# Patient Record
Sex: FEMALE | Race: WHITE | NOT HISPANIC OR LATINO | Employment: FULL TIME | URBAN - METROPOLITAN AREA
[De-identification: names, ages, dates, MRNs, and addresses within clinical notes are randomized per-mention and may not be internally consistent; named-entity substitution may affect disease eponyms.]

---

## 2017-01-16 DIAGNOSIS — R10.2 PELVIC AND PERINEAL PAIN: ICD-10-CM

## 2017-01-17 ENCOUNTER — HOSPITAL ENCOUNTER (OUTPATIENT)
Dept: ULTRASOUND IMAGING | Facility: HOSPITAL | Age: 37
Discharge: HOME | End: 2017-01-17
Payer: COMMERCIAL

## 2017-01-17 DIAGNOSIS — Z30.431 IUD CHECK UP: ICD-10-CM

## 2017-01-17 PROCEDURE — 76857 US EXAM PELVIC LIMITED: CPT

## 2017-02-13 ENCOUNTER — ALLSCRIPTS OFFICE VISIT (OUTPATIENT)
Dept: OTHER | Facility: OTHER | Age: 37
End: 2017-02-13

## 2017-06-17 ENCOUNTER — APPOINTMENT (OUTPATIENT)
Dept: LAB | Facility: MEDICAL CENTER | Age: 37
End: 2017-06-17
Payer: COMMERCIAL

## 2017-06-17 ENCOUNTER — TRANSCRIBE ORDERS (OUTPATIENT)
Dept: ADMINISTRATIVE | Facility: HOSPITAL | Age: 37
End: 2017-06-17

## 2017-06-17 DIAGNOSIS — Z00.8 HEALTH EXAMINATION IN POPULATION SURVEY: Primary | ICD-10-CM

## 2017-06-17 DIAGNOSIS — Z00.8 HEALTH EXAMINATION IN POPULATION SURVEY: ICD-10-CM

## 2017-06-17 LAB
EST. AVERAGE GLUCOSE BLD GHB EST-MCNC: 103 MG/DL
HBA1C MFR BLD: 5.2 % (ref 4.2–6.3)

## 2017-06-17 PROCEDURE — 83036 HEMOGLOBIN GLYCOSYLATED A1C: CPT

## 2017-06-17 PROCEDURE — 36415 COLL VENOUS BLD VENIPUNCTURE: CPT

## 2017-06-21 ENCOUNTER — APPOINTMENT (OUTPATIENT)
Dept: LAB | Facility: HOSPITAL | Age: 37
End: 2017-06-21
Payer: COMMERCIAL

## 2017-06-21 ENCOUNTER — TRANSCRIBE ORDERS (OUTPATIENT)
Dept: ADMINISTRATIVE | Facility: HOSPITAL | Age: 37
End: 2017-06-21

## 2017-06-21 DIAGNOSIS — Z00.8 HEALTH EXAMINATION IN POPULATION SURVEY: ICD-10-CM

## 2017-06-21 DIAGNOSIS — Z00.8 HEALTH EXAMINATION IN POPULATION SURVEY: Primary | ICD-10-CM

## 2017-06-21 LAB
CHOLEST SERPL-MCNC: 159 MG/DL (ref 50–200)
HDLC SERPL-MCNC: 52 MG/DL (ref 40–60)
LDLC SERPL CALC-MCNC: 92 MG/DL (ref 0–100)
TRIGL SERPL-MCNC: 75 MG/DL

## 2017-06-21 PROCEDURE — 36415 COLL VENOUS BLD VENIPUNCTURE: CPT

## 2017-06-21 PROCEDURE — 80061 LIPID PANEL: CPT

## 2017-08-30 ENCOUNTER — ALLSCRIPTS OFFICE VISIT (OUTPATIENT)
Dept: OTHER | Facility: OTHER | Age: 37
End: 2017-08-30

## 2017-12-15 ENCOUNTER — ALLSCRIPTS OFFICE VISIT (OUTPATIENT)
Dept: OTHER | Facility: OTHER | Age: 37
End: 2017-12-15

## 2017-12-16 NOTE — PROGRESS NOTES
Assessment  1  Encounter for gynecological examination () (Z01 702)    Discussion/Summary    Patient informed of a normal GYN exam  A pap was not performed due to her previous normal pap with negative HPV in 2016  She is to return to the office in one year or sooner if needed  The patient has the current Goals: To continue to maintain a healthy lifestyle  The patent has the current Barriers: No barriers  Patient is able to Self-Care  Chief Complaint  Annual visit      History of Present Illness  HPI: Patient is a 40year old  with prior 2 vaginal deliveries  Method of contraception is Mirena  She does not get a menses, only occasional spotting  She is happily   No issues with intimacy  Denies /GI complaints  Has some anxiety for which she takes xanax as needed  She loss about 50 lbs and is very happy with the progress  There are no new medical or family illnesses to report  GYN HM, Adult Female [facility]: The patient is being seen for a gynecology evaluation  The last health maintenance visit was 1 year(s) ago  General Health: The patient's health since the last visit is described as good  She has regular dental visits  -- She denies vision problems  -- She denies hearing loss  Lifestyle:  She consumes a diverse and healthy diet  -- She does not have any weight concerns  -- She exercises regularly  -- She does not use tobacco -- She consumes alcohol  She reports occasional alcohol use  She typically drinks wine  -- She denies drug use  Reproductive health:  she uses contraception  For contraception, she has an intrauterine device  -- she is sexually active  -- no menses- has IUD  Screening:      Review of Systems   Constitutional: No fever, no chills, feels well, no tiredness, no recent weight gain or loss  ENT: no ear ache, no loss of hearing, no nosebleeds or nasal discharge, no sore throat or hoarseness    Cardiovascular: no complaints of slow or fast heart rate, no chest pain, no palpitations, no leg claudication or lower extremity edema  Respiratory: no complaints of shortness of breath, no wheezing, no dyspnea on exertion, no orthopnea or PND  Breasts: no complaints of breast pain, breast lump or nipple discharge  Gastrointestinal: no complaints of abdominal pain, no constipation, no nausea or diarrhea, no vomiting, no bloody stools  Genitourinary: no complaints of dysuria, no incontinence, no pelvic pain, no dysmenorrhea, no vaginal discharge or abnormal vaginal bleeding  Musculoskeletal: no complaints of arthralgia, no myalgia, no joint swelling or stiffness, no limb pain or swelling  Integumentary: no complaints of skin rash or lesion, no itching or dry skin, no skin wounds  Neurological: no complaints of headache, no confusion, no numbness or tingling, no dizziness or fainting  Over the past 2 weeks, how often have you been bothered by the following problems? 1 ) Little interest or pleasure in doing things? Not at all   2 ) Feeling down, depressed or hopeless? Not at all   3 ) Trouble falling asleep or sleeping too much? Not at all   4 ) Feeling tired or having little energy? Not at all   5 ) Poor appetite or overeating? Not at all   6 ) Feeling bad about yourself, or that you are a failure, or have let yourself or your family down? Not at all   7 ) Trouble concentrating on things, such as reading a newspaper or watching television? Not at all   8 ) Moving or speaking so slowly that other people could have noticed, or the opposite, moving or speaking faster than usual? Not at all  How difficult have these problems made it for you to do your work, take care of things at home, or get along with people? Not at all  Score       OB History  Pregnancy History (Brief):  Prior pregnancies: : 2  Para: 2 (full-term)-- and-- 2 (living)  Delivery type: 2 vaginal       Active Problems  1  Abdominal pain, LLQ (left lower quadrant) (789 04) (R10 32)   2   Abdominal pain, RLQ (right lower quadrant) (789 03) (R10 31)   3  Abnormal TSH (790 6) (R94 6)   4  Acute frontal sinusitis (461 1) (J01 10)   5  Acute URI (465 9) (J06 9)   6  Anxiety (300 00) (F41 9)   7  Anxiety (300 00) (F41 9)   8  Back pain (724 5) (M54 9)   9  Cervicalgia (723 1) (M54 2)   10  Dehydration, mild (276 51) (E86 0)   11  Encounter for gynecological examination (V72 31) (Z01 419)   12  Encounter for insertion of mirena IUD (V25 11) (Z30 430)   13  Generalized anxiety disorder (300 02) (F41 1)   14  Headache (784 0) (R51)   15  Insomnia (780 52) (G47 00)   16  IUD (intrauterine device) in place (V45 51) (Z97 5)   17  Lumbar paraspinal muscle spasm (724 8) (M62 830)   18  Lump in neck (784 2) (R22 1)   19  Lymphadenopathy (785 6) (R59 1)   20  Non-toxic multinodular goiter (241 1) (E04 2)   21  Panic disorder (300 01) (F41 0)   22  Pelvic pain (R10 2)   23  Ringworm (110 9) (B35 9)   24  Skin rash (782 1) (R21)   25  Weight gain (783 1) (R63 5)    Past Medical History   · History of Acute sinusitis (461 9) (J01 90)   · History of acute sinusitis (V12 69) (Z87 09)   · Lumbar Sprain (847 2)   · History of Right wrist pain (719 43) (M25 531)    Surgical History   · Denied: History Of Prior Surgery    Family History  Mother    · Family history of Osteoporosis (V17 81)  Family History    · Family history of Family Health Status Of Father - Alive   · Family history of Family Health Status Of Mother - Alive    Social History   · Denied: History of Alcohol Use (History)   · Caffeine Use   · IUD (intrauterine device) in place (V45 51) (Z97 5)   · Never A Smoker   · No drug use    Current Meds   1  Zolpidem Tartrate 5 MG Oral Tablet; 1 Every Day At Bedtime, As Needed; Therapy: 86Mym4576 to (Last Rx:13Oct2017)  Requested for: 13Oct2017 Ordered    Allergies  1   No Known Drug Allergies    Vitals   Recorded: 92DKU8272 57:84UQ   Systolic 789   Diastolic 80   Height 5 ft 7 in   Weight 132 lb    BMI Calculated 20 67   BSA Calculated 1 69     Physical Exam   Constitutional  General appearance: No acute distress, well appearing and well nourished  Neck  Neck: Normal, supple, trachea midline, no masses  Thyroid: Normal, no thyromegaly  Pulmonary  Respiratory effort: No increased work of breathing or signs of respiratory distress  Auscultation of lungs: Clear to auscultation  Cardiovascular  Auscultation of heart: Normal rate and rhythm, normal S1 and S2, no murmurs  Peripheral vascular exam: Normal pulses Throughout  Genitourinary  External genitalia: Normal and no lesions appreciated  Vagina: Normal, no lesions or dryness appreciated  Urethra: Normal    Urethral meatus: Normal    Bladder: Normal, soft, non-tender and no prolapse or masses appreciated  Cervix: Normal, no palpable masses  -- IUD string visualized  Uterus: Normal, non-tender, not enlarged, and no palpable masses  Adnexa/parametria: Normal, non-tender and no fullness or masses appreciated  Chest  Breasts: Normal and no dimpling or skin changes noted  Abdomen  Abdomen: Normal, non-tender, and no organomegaly noted  Liver and spleen: No hepatomegaly or splenomegaly  Examination for hernias: No hernias appreciated  Lymphatic  Palpation of lymph nodes in neck, axillae, groin and/or other locations: No lymphadenopathy or masses noted  Skin  Skin and subcutaneous tissue: Normal skin turgor and no rashes     Palpation of skin and subcutaneous tissue: Normal    Psychiatric  Orientation to person, place, and time: Normal    Mood and affect: Normal        Signatures   Electronically signed by : To Holley; Dec 15 2017  1:43PM EST                       (Author)

## 2018-01-10 NOTE — PROGRESS NOTES
History of Present Illness  HPI: Matthew Saravia with a 19 2# weight loss in the past 4 months with a 16# fat mass loss ( 83%)  Food Journaling - some days 800-1000 on non- exercise days and flex 1200 calories  Work days on the way more hungry   Bariatric MNT [facility]:   Weight Assessment: IBW: 132 5#, Goal Weight: 130#  Excess calories come from in between meal snacking, large portions at mealtimes and high calorie high fat food choices  Dietary Recall:   Breakfast: Shake  Snack: bar  Lunch: Salad with lean protein  Snack: yogurt or fruit  Dinner: [employer]  Snack: smaller carb snack         Exercise Frequency:  She does not exercise  Nutrition Prescription: Estimated calories for weight loss: [place] REE:1253 weight loss with exercise:3570-8438 Without exercise: 873-1123 calories, Estimated daily protein needs: 72-90gm ( 1,2 and Estimated daily fluid needs: 70oz ( 35ml/kg IBW)  Nutrition Intervention: Counseling provided with emphasis on meal planning, portion sizes, healthy snack choices, hydration, fiber intake, exercise and food journaling  Education materials provided: New Direction manual    Comprehension: good  Expected Compliance: good  Goals: follow meal plan prescribed, reduce portion sizes at mealtimes, plan meals and snacks daily, Choose lower-calorie, lower-fat meal options at home and when dining out, food journal, do not skip meals or snacks and 800 -1000 calories daily using 2 meal replacements  Monitor/Evaluation: weekly weight, food journal, fluid intake and exercise level  Active Problems    1  Abdominal pain, LLQ (left lower quadrant) (789 04) (R10 32)   2  Abdominal pain, RLQ (right lower quadrant) (789 03) (R10 31)   3  Abnormal TSH (790 6) (R79 89)   4  Acute frontal sinusitis (461 1) (J01 10)   5  Acute URI (465 9) (J06 9)   6  Anxiety (300 00) (F41 9)   7  Anxiety (300 00) (F41 9)   8  Back pain (724 5) (M54 9)   9  Cervicalgia (723 1) (M54 2)   10   Dehydration, mild (276 51) (E86 0)   11  Generalized anxiety disorder (300 02) (F41 1)   12  Headache (784 0) (R51)   13  Insomnia (780 52) (G47 00)   14  Lumbar paraspinal muscle spasm (724 8) (M62 830)   15  Lump in neck (784 2) (R22 1)   16  Lymphadenopathy (785 6) (R59 1)   17  Non-toxic multinodular goiter (241 1) (E04 2)   18  Panic disorder (300 01) (F41 0)   19  Skin rash (782 1) (R21)   20  Weight gain (783 1) (R63 5)    Past Medical History    1  History of Acute sinusitis (461 9) (J01 90)   2  History of acute sinusitis (V12 69) (Z87 09)   3  Lumbar Sprain (847 2)   4  History of Right wrist pain (719 43) (M25 531)    Surgical History    1  Denied: History Of Prior Surgery    Family History  Mother    1  Family history of Osteoporosis (V17 81)  Family History    2  Family history of Family Health Status Of Father - Alive   3  Family history of Family Health Status Of Mother - Alive    Social History    · Denied: History of Alcohol Use (History)   · Caffeine Use   · Never A Smoker   · No drug use    Current Meds   1  ALPRAZolam 0 25 MG Oral Tablet; TAKE 1 TABLET TWICE DAILY AS NEEDED; Therapy: 09IFW9652 to (Evaluate:22Sjd0905); Last Rx:08Uxq2528 Ordered   2  Ambien 10 MG Oral Tablet Recorded   3  Sertraline HCl - 100 MG Oral Tablet; Take 1 tablet daily; Therapy: 61QKG9105 to (Evaluate:02Drz1567)  Requested for: 42Bfo9821; Last   Rx:96Fbt2542 Ordered   4  TraMADol HCl - 50 MG Oral Tablet; Take one tablet every six hours as needed for pain; Therapy: 85PDL0454 to (Last Rx:70Qbn7101) Ordered   5  Tylenol 325 MG Oral Tablet; Therapy: (Recorded:35Ndf7614) to Recorded   6  Zolpidem Tartrate 5 MG Oral Tablet; 1 Every Day At Bedtime, As Needed; Therapy: 09Lbs7162 to (Last Rx:03Wan3357)  Requested for: 18Jwi9609 Ordered    Allergies    1   No Known Drug Allergies    Vitals  Signs [Data Includes: Current Encounter]   Recorded: 23MTJ9454 09:18AM   Height: 5 ft 7 in  Weight: 147 lb 9 6 oz  BMI Calculated: 23 12  BSA Calculated: 1 78    Results/Data  Encounter Results   Tanita Flowsheet 57LPX8764 11:21AM Benedict Valdez     Test Name Result Flag Reference   Height 66 5     Weight 147 6     Body Fat % 33 1     Fat Mass 48 8     FFM ( Fat Free Mass) 98 8     Muscle Mass 93 8     TBW ( Total Body Water) 65 6     TBW % 44 4     Bone Mass 5 0     BMR ( Basal Metabolic Rate) 8627     Metabolic Age 41     Visceral Fat Rating 4     BMI 20 2       Metabolic Analyzer - POC 84UHS9187 11:20AM Benedict Valdez     Test Name Result Flag Reference   Metabolic Analyzer 0430         Future Appointments    Date/Time Provider Specialty Site   07/14/2016 09:30 AM Benedict Valdez  [facility] WEIGHT MANAGEMENT [place]   07/21/2016 09:30 AM GUY Arango   Bariatric Medicine [facility] WEIGHT MANAGEMENT [place]     Signatures   Electronically signed by : Myrna Castle, ; Jul  3 2016 11:18AM EST                       (Author)    Electronically signed by : GUY Baker ; Jul 5 2016  7:48AM EST                       (Co-author)

## 2018-01-10 NOTE — PROGRESS NOTES
History of Present Illness  HPI: Dejan Lowe with 6 3# in 2 months and overall 8 2 #   Fitnesspal 1200 calories daily  Weekends more challenges She continues to interval eat using 2 meal replacements daily with 1 protein bar  Bariatric MNT [facility]:   Weight Assessment: IBW: 132 5#, Goal Weight: 130-140#  Excess calories come from in between meal snacking, large portions at mealtimes and high calorie high fat food choices  Dietary Recall:   Breakfast: Shake  Snack: string cheese and apple  Lunch: 400   salad chicken  Snack: Bar  Dinner: [employer]  Snack: 80 calorie Yogurt   Beverages: water  Estimated fluid intake: >64oz   She dines out 1-2 times per week  Exercise Frequency:  She exercises walk with kids Steps -8,000 steps   Nutrition Prescription: Estimated calories for weight loss: Tanita BMR 1432 x 1 3 -500-750 = 112-1361 calories , Estimated daily protein needs: 72-90 grams protein  and Estimated daily fluid needs: 70oz  Nutrition Intervention: Counseling provided with emphasis on meal planning, portion sizes, healthy snack choices, hydration, fiber intake, protein intake, exercise and food journaling  Education materials provided: New Direction manual    Comprehension: good  Expected Compliance: good  Goals: follow meal plan prescribed, reduce portion sizes at mealtimes, plan meals and snacks daily, Choose lower-calorie, lower-fat meal options at home and when dining out, food journal, do not skip meals or snacks and 1200 calories and can flex to 1300 calories on days of 8,000 steps and 30-40 minute walk  Monitor/Evaluation: weekly weight, food journal, fluid intake and exercise level  Active Problems     1  Acute frontal sinusitis (461 1) (J01 10)   2  Acute URI (465 9) (J06 9)   3  Anxiety (300 00) (F41 9)   4  Cervicalgia (723 1) (M54 2)   5  Generalized anxiety disorder (300 02) (F41 1)   6  Headache (784 0) (R51)   7  Insomnia (780 52) (G47 00)   8   Lump in neck (784  2) (R22 1)   9  Lymphadenopathy (785 6) (R59 1)   10  Panic disorder (300 01) (F41 0)    Non-toxic multinodular goiter (241 1) (E04 2)       Weight gain (783 1) (R63 5)          Past Medical History    1  History of Acute sinusitis (461 9) (J01 90)   2  Lumbar Sprain (847 2)    Surgical History    1  Denied: History Of Prior Surgery    Family History    1  Family history of Osteoporosis (V17 81)    2  Family history of Family Health Status Of Father - Alive   3  Family history of Family Health Status Of Mother - Alive    Social History    · Denied: History of Alcohol Use (History)   · Caffeine Use   · Never A Smoker   · No drug use    Current Meds   1  ALPRAZolam 0 25 MG Oral Tablet; TAKE 1 TABLET TWICE DAILY AS NEEDED; Therapy: 57UKI7300 to (Evaluate:74Cxj4873); Last Rx:90Uxu2115 Ordered   2  Fluticasone Propionate 50 MCG/ACT Nasal Suspension; USE 1 SPRAY IN EACH   NOSTRIL TWICE DAILY; Therapy: 87AFH8343 to (Last Rx:13Jun2015)  Requested for: 13Jun2015 Ordered   3  Sertraline HCl - 100 MG Oral Tablet; Take 1 tablet daily; Therapy: 75DLX9421 to (Evaluate:05Ngl9501)  Requested for: 92Hga2929; Last   Rx:42Fhj7587 Ordered   4  Tylenol 325 MG Oral Tablet; Therapy: (Recorded:32Flp0129) to Recorded   5  Zolpidem Tartrate 5 MG Oral Tablet; 1 Every Day At Bedtime, As Needed; Therapy: 73Bkn2196 to (Last Rx:32Xnm8751)  Requested for: 75Qet5639 Ordered    Allergies    1  No Known Drug Allergies    Future Appointments    Date/Time Provider Specialty Site   04/07/2016 10:30 AM Jeremias Donnelly  [address]   04/21/2016 10:30 AM Jeremias Donnelly  [facility] WEIGHT MANAGEMENT [place]   05/19/2016 10:00 AM GUY Neville   Bariatric Medicine [facility] WEIGHT [facility]     Signatures   Electronically signed by : Charity Hopper, ; Mar 17 2016 11:04AM EST                       (Author)    Electronically signed by : GUY Cantu ; Mar 17 2016  1:16PM EST (Co-author)

## 2018-01-11 NOTE — PROGRESS NOTES
History of Present Illness  HPI: Jenny Bullock with 14 9# loss in the past 3 months  She reported a hIgher calorie intake the past 2 weeks related to increased stress  She is using 2 meal replacements and a bar daily  Bariatric MNT [facility]:   Weight Assessment: IBW: 132 5#, Goal Weight: 130-140#  Excess calories come from in between meal snacking, large portions at mealtimes and high calorie high fat food choices  Dietary Recall:   Breakfast: Shake  Snack: apple  Lunch: 400  Snack: bar  Dinner: [employer]  Snack: yogurt   Beverages: water  Estimated fluid intake: >64oz  Exercise Frequency:  She does not exercise  Nutrition Prescription: Estimated calories for weight loss: Tanita BMR: 1432 x 1 3 -500-750 = 5026-3712 calories Ecal BMR: 1430 Weight loss : 966-1216 calories, Estimated daily protein needs: 72-90gm ( 1 2-1 5 gm/kg IBW) and Estimated daily fluid needs: 70oz fluid (35ml/kg IBW)  Nutrition Intervention: Counseling provided with emphasis on meal planning, portion sizes, healthy snack choices, hydration, fiber intake, protein intake, exercise and food journaling  Education materials provided: New Direction manual    Comprehension: good  Goals: follow meal plan prescribed, reduce portion sizes at mealtimes, plan meals and snacks daily, Choose lower-calorie, lower-fat meal options at home and when dining out, food journal, do not skip meals or snacks and 1200 -1300 calories   Monitor/Evaluation: weekly weight, food journal, fluid intake and exercise level  Active Problems    1  Abdominal pain, LLQ (left lower quadrant) (789 04) (R10 32)   2  Abdominal pain, RLQ (right lower quadrant) (789 03) (R10 31)   3  Abnormal TSH (790 6) (R79 89)   4  Acute frontal sinusitis (461 1) (J01 10)   5  Acute URI (465 9) (J06 9)   6  Anxiety (300 00) (F41 9)   7  Anxiety (300 00) (F41 9)   8  Back pain (724 5) (M54 9)   9  Cervicalgia (723 1) (M54 2)   10   Dehydration, mild (276 51) (E86 0) 11  Generalized anxiety disorder (300 02) (F41 1)   12  Headache (784 0) (R51)   13  Insomnia (780 52) (G47 00)   14  Lumbar paraspinal muscle spasm (724 8) (M62 830)   15  Lump in neck (784 2) (R22 1)   16  Lymphadenopathy (785 6) (R59 1)   17  Non-toxic multinodular goiter (241 1) (E04 2)   18  Panic disorder (300 01) (F41 0)   19  Skin rash (782 1) (R21)   20  Weight gain (783 1) (R63 5)    Past Medical History    1  History of Acute sinusitis (461 9) (J01 90)   2  History of acute sinusitis (V12 69) (Z87 09)   3  Lumbar Sprain (847 2)   4  History of Right wrist pain (719 43) (M25 531)    Surgical History    1  Denied: History Of Prior Surgery    Family History  Mother    1  Family history of Osteoporosis (V17 81)  Family History    2  Family history of Family Health Status Of Father - Alive   3  Family history of Family Health Status Of Mother - Alive    Social History    · Denied: History of Alcohol Use (History)   · Caffeine Use   · Never A Smoker   · No drug use    Current Meds   1  ALPRAZolam 0 25 MG Oral Tablet; TAKE 1 TABLET TWICE DAILY AS NEEDED; Therapy: 89AZQ2213 to (Evaluate:55Fib6971); Last Rx:23Dec2015 Ordered   2  Ambien 10 MG Oral Tablet Recorded   3  Methocarbamol 750 MG Oral Tablet; TAKE 1 TABLET 4 TIMES DAILY AS NEEDED; Therapy: 26IOV5094 to (Evaluate:04Apr2016); Last Rx:27Mar2016 Ordered   4  Ondansetron 4 MG Oral Tablet Dispersible; 1 tab po q 4-6 hrs prn nausea; Therapy: 62YEU0079 to (Last Rx:27Mar2016) Ordered   5  Sertraline HCl - 100 MG Oral Tablet; Take 1 tablet daily; Therapy: 39IRA7225 to (Evaluate:02Mvy8854)  Requested for: 23Dec2015; Last   Rx:23Dec2015 Ordered   6  TraMADol HCl - 50 MG Oral Tablet; Take one tablet every six hours as needed for pain; Therapy: 20RME4238 to (Last Rx:27Mar2016) Ordered   7  Tylenol 325 MG Oral Tablet; Therapy: (Recorded:84Kbb1816) to Recorded   8  Zoloft 100 MG Oral Tablet Recorded   9   Zolpidem Tartrate 5 MG Oral Tablet; 1 Every Day At Bedtime, As Needed; Therapy: 54Cqy6113 to (Last Rx:32Pvk2999)  Requested for: 89Vpa4530 Ordered    Allergies    1  No Known Drug Allergies    Vitals  Signs [Data Includes: Current Encounter]   Recorded: 77EDV5368 11:06AM   Height: 5 ft 7 in  Weight: 153 lb 12 8 oz  BMI Calculated: 24 09  BSA Calculated: 1 81    Future Appointments    Date/Time Provider Specialty Site   06/02/2016 09:00 AM [facility] WEIGHT [facility]   06/30/2016 09:00 AM [facility]   05/19/2016 10:00 AM GUY Jimenez   Bariatric Medicine [facility]     Signatures   Electronically signed by : Aneesh Spangler, ; May  5 2016 12:24PM EST                       (Author)    Electronically signed by : GUY Bhat ; May  6 2016  9:45AM EST                       (Co-author)

## 2018-01-11 NOTE — PROGRESS NOTES
Chief Complaint  Chief Complaint Free Text Note Form: Pt is here for her 2 month MWM f/u  History of Present Illness  HPI: Patient has been doing well on the program  Still unable to incorporate regular physical activity      Past Medical History    1  History of Acute sinusitis (461 9) (J01 90)   2  History of acute sinusitis (V12 69) (Z87 09)   3  Lumbar Sprain (847 2)   4  History of Right wrist pain (719 43) (M25 531)    Assessment    1  Weight gain (783 1) (R63 5)   2  Anxiety (300 00) (F41 9)   3  Abnormal TSH (790 6) (R79 89)    Plan  Lumbar paraspinal muscle spasm    1  Methocarbamol 750 MG Oral Tablet   2  Ondansetron 4 MG Oral Tablet Dispersible  Unlinked    3  Zoloft 100 MG Oral Tablet (Sertraline HCl)    Discussion/Summary  Discussion Summary:   29 yo female w/ HPL, anxiety and excess weight here to pursue medical weight management to improve weight and health  Weight gain:  -enrolled in [facility] program   -initial focus of 5-10% weight loss over 3-6 mos for improved health  -screening labs-completed    HPL:  -lifestyle changes for now    Goiter:  -TSH-mildly elevated, will recheck at next visit    Anxiety:  -Stopped sertraline     RTC in 2 mos  -add 2 days per week of resistance training  -Try to incorporate more cardio       Active Problems    1  Abdominal pain, LLQ (left lower quadrant) (789 04) (R10 32)   2  Abdominal pain, RLQ (right lower quadrant) (789 03) (R10 31)   3  Abnormal TSH (790 6) (R79 89)   4  Acute frontal sinusitis (461 1) (J01 10)   5  Acute URI (465 9) (J06 9)   6  Anxiety (300 00) (F41 9)   7  Anxiety (300 00) (F41 9)   8  Back pain (724 5) (M54 9)   9  Cervicalgia (723 1) (M54 2)   10  Dehydration, mild (276 51) (E86 0)   11  Generalized anxiety disorder (300 02) (F41 1)   12  Headache (784 0) (R51)   13  Insomnia (780 52) (G47 00)   14  Lumbar paraspinal muscle spasm (724 8) (M62 830)   15  Lump in neck (784 2) (R22 1)   16  Lymphadenopathy (785 6) (R59 1)   17   Non-toxic multinodular goiter (241 1) (E04 2)   18  Panic disorder (300 01) (F41 0)   19  Skin rash (782 1) (R21)   20  Weight gain (783 1) (R63 5)    Surgical History    1  Denied: History Of Prior Surgery    Family History  Mother    1  Family history of Osteoporosis (V17 81)  Family History    2  Family history of Family Health Status Of Father - Alive   3  Family history of Family Health Status Of Mother - Alive    Social History    · Denied: History of Alcohol Use (History)   · Caffeine Use   · Never A Smoker   · No drug use    Current Meds   1  ALPRAZolam 0 25 MG Oral Tablet; TAKE 1 TABLET TWICE DAILY AS NEEDED; Therapy: 12NSS8006 to (Evaluate:31Rya6263); Last Rx:14Ljf5587 Ordered   2  Ambien 10 MG Oral Tablet Recorded   3  Methocarbamol 750 MG Oral Tablet; TAKE 1 TABLET 4 TIMES DAILY AS NEEDED; Therapy: 30SHW2027 to (Evaluate:04Apr2016); Last Rx:27Mar2016 Ordered   4  Ondansetron 4 MG Oral Tablet Dispersible; 1 tab po q 4-6 hrs prn nausea; Therapy: 02FDT9087 to (Last Rx:27Mar2016) Ordered   5  Sertraline HCl - 100 MG Oral Tablet; Take 1 tablet daily; Therapy: 15KAH7060 to (Evaluate:40Zvp9729)  Requested for: 38Xgv5636; Last   Rx:87Ury3926 Ordered   6  TraMADol HCl - 50 MG Oral Tablet; Take one tablet every six hours as needed for pain; Therapy: 62DXR7595 to (Last Rx:27Mar2016) Ordered   7  Tylenol 325 MG Oral Tablet; Therapy: (Recorded:90Nhg1758) to Recorded   8  Zoloft 100 MG Oral Tablet Recorded   9  Zolpidem Tartrate 5 MG Oral Tablet; 1 Every Day At Bedtime, As Needed; Therapy: 29Yqa6988 to (Last Rx:84Dnn8432)  Requested for: 94Eoo4532 Ordered    Allergies    1   No Known Drug Allergies    Vitals  Vital Signs [Data Includes: Current Encounter]    Recorded: 26PSK7034 10:13AM   Temperature 97 6 F   Heart Rate 80   Respiration 16   Systolic 058   Diastolic 70   Height 5 ft 7 in   Weight 152 lb 9 6 oz   BMI Calculated 23 9   BSA Calculated 1 8     Physical Exam    Constitutional   General appearance: No acute distress, well appearing and well nourished  well developed and overweight  Eyes no conjunctival pallor  Ears, Nose, Mouth, and Throat moist oral mucosa  Pulmonary   Respiratory effort: No increased work of breathing or signs of respiratory distress  Auscultation of lungs: Clear to auscultation  Cardiovascular   Auscultation of heart: Normal rate and rhythm, normal S1 and S2, without murmurs  Examination of extremities for edema and/or varicosities: Normal     Abdomen   Abdomen: Non-tender, no masses  The abdomen was soft and nontender  Musculoskeletal   Gait and station: Normal     Psychiatric   Orientation to person, place, and time: Normal     Mood and affect: Normal          Future Appointments    Date/Time Provider Specialty Site   06/23/2016 09:30 AM Sinan Jeannie  [place] [address]   06/30/2016 09:00 AM oMre Vargas   07/21/2016 09:30 AM GUY Ramírez   Bariatric Medicine [facility]     Signatures   Electronically signed by : GUY Mccoy ; May 19 2016  4:44PM EST                       (Author)    Electronically signed by : GUY Mccoy ; May 19 2016  4:45PM EST                       (Author)

## 2018-01-12 NOTE — PROGRESS NOTES
Chief Complaint  Chief Complaint Free Text Note Form: Pt is here for her 1 month ND f/u  She denies any concerns  Past Medical History    1  History of Acute sinusitis (461 9) (J01 90)   2  Lumbar Sprain (847 2)    Assessment    1  Weight gain (783 1) (R63 5)   2  Non-toxic multinodular goiter (241 1) (E04 2)   3  Abnormal TSH (790 6) (R79 89)    Discussion/Summary  Discussion Summary:   29 yo female w/ HPL, anxiety and excess weight here to pursue medical weight management to improve weight and health  Weight gain:  -enrolled in [facility] program   -initial focus of 5-10% weight loss over 3-6 mos for improved health  -screening labs-completed    HPL:  -lifestyle changes for now    Goiter:  -TSH-mildly elevated, will recheck at next visit    Anxiety:  -on sertraline and alprazolam    RTC in 2 mos  -add 2 days per week of resistance training       Active Problems    1  Acute frontal sinusitis (461 1) (J01 10)   2  Acute URI (465 9) (J06 9)   3  Anxiety (300 00) (F41 9)   4  Cervicalgia (723 1) (M54 2)   5  Generalized anxiety disorder (300 02) (F41 1)   6  Headache (784 0) (R51)   7  Insomnia (780 52) (G47 00)   8  Lump in neck (784 2) (R22 1)   9  Lymphadenopathy (785 6) (R59 1)   10  Non-toxic multinodular goiter (241 1) (E04 2)   11  Panic disorder (300 01) (F41 0)   12  Weight gain (783 1) (R63 5)    Surgical History    1  Denied: History Of Prior Surgery    Family History    1  Family history of Osteoporosis (V17 81)    2  Family history of Family Health Status Of Father - Alive   3  Family history of Family Health Status Of Mother - Alive    Social History    · Denied: History of Alcohol Use (History)   · Caffeine Use   · Never A Smoker   · No drug use    Current Meds   1  ALPRAZolam 0 25 MG Oral Tablet; TAKE 1 TABLET TWICE DAILY AS NEEDED; Therapy: 21FOI8433 to (Evaluate:80Nvm8789); Last Rx:83Vek7473 Ordered   2   Fluticasone Propionate 50 MCG/ACT Nasal Suspension; USE 1 SPRAY IN EACH   NOSTRIL TWICE DAILY; Therapy: 11EHW3506 to (Last Rx:13Jun2015)  Requested for: 13Jun2015 Ordered   3  Sertraline HCl - 100 MG Oral Tablet; Take 1 tablet daily; Therapy: 75PEJ1677 to (Evaluate:90Imk4220)  Requested for: 65Jxj5857; Last   Rx:10Yvd8487 Ordered   4  Tylenol 325 MG Oral Tablet; Therapy: (Recorded:66Gdy4075) to Recorded   5  Zolpidem Tartrate 5 MG Oral Tablet; 1 Every Day At Bedtime, As Needed; Therapy: 09Czu0480 to (Last Rx:41Hur0744)  Requested for: 59Xbt9233 Ordered    Allergies    1  No Known Drug Allergies    Vitals  Vital Signs [Data Includes: Current Encounter]    Recorded: 21TSL7673 10:31AM   Temperature 98 6 F   Heart Rate 80   Respiration 16   Systolic 584   Diastolic 76   Height 5 ft 7 in   Weight 160 lb 8 0 oz   BMI Calculated 25 14   BSA Calculated 1 84     Physical Exam    Constitutional   General appearance: No acute distress, well appearing and well nourished  well developed and overweight  Eyes no conjunctival pallor  Ears, Nose, Mouth, and Throat moist oral mucosa  Pulmonary   Respiratory effort: No increased work of breathing or signs of respiratory distress  Auscultation of lungs: Clear to auscultation  Cardiovascular   Auscultation of heart: Normal rate and rhythm, normal S1 and S2, without murmurs  Examination of extremities for edema and/or varicosities: Normal     Abdomen   Abdomen: Non-tender, no masses  The abdomen was soft and nontender  Musculoskeletal   Gait and station: Normal     Psychiatric   Orientation to person, place, and time: Normal     Mood and affect: Normal          Future Appointments    Date/Time Provider Specialty Site   04/07/2016 10:30 AM Flor Eliasspan  [address]   04/21/2016 10:30 AM More Vargas    05/19/2016 10:00 AM GUY Jhaveri   Bariatric Medicine [facility]     Signatures   Electronically signed by : GUY Treadwell ; Mar 17 2016 12:30PM EST (Author)

## 2018-01-13 NOTE — PROGRESS NOTES
History of Present Illness  HPI: Wilver Lebron presented for her [employer] visit with the [employer]  She stated that she has experienced a gradual gain  Her food recall reveals that she often goes long periods between meals and weekends vary with calorie level  Bariatric MNT MWM [facility]:   Weight Assessment: IBW: 132 5# ( 60 2kg) WT: 166 8# BMI: 26 5, ABW: 141#, Goal Weight: 130-140#  Excess calories come from in between meal snacking and high calorie high fat food choices  Dietary Recall:   Breakfast: wake 7:00am - green tea   Work 9:00am skip  Carb    Snack: 10:30 yogurt/ banana  Lunch: 1:00pm Bring   Chicken/ vegetable  Soup/ salad from cafe  Snack: chocolate - piece  fruit  Dinner: Home 8:00pm - green tea   Days off - lean protein / veggies/ starch  Snack: wkd snack   Beverages: Diet Green Tea/ water  Estimated fluid intake: >64oz  Food allergies/intolerances: pamper  spice   Cooking:   Shopping: patient  She dines out 1-2 times per week  Exercise Frequency:  She exercises would walk  Nutrition Prescription: Estimated calories for weight loss: Tanita BMR: 1432 x 1  3 - 750-500 = 6098-1364 calories Ecal BMR: 1490 Weight loss range: 788-1049 calories, Estimated daily protein needs: 72-90 gm (1 2-1 5 gm/kg IBW) and Estimated daily fluid needs: 70oz ( 35ml/kg IBW)  Breakfast: Shake  Snack: 150  Lunch: 400-500  Snack: Bar  Dinner: [employer]  Snack: skip   Nutrition Intervention: Counseling provided with emphasis on meal planning, portion sizes, healthy snack choices, hydration, fiber intake, exercise and food journaling  Education materials provided: [employer] manual, calorie controlled menus, low carbohydrate meal planning, high fiber, lean protein food choices and food journal tips  Comprehension: good  Expected Compliance: good     Goals: follow meal plan prescribed, reduce portion sizes at mealtimes, plan meals and snacks daily, Choose lower-calorie, lower-fat meal options at home and when dining out, food journal, do not skip meals or snacks, increase protein intake 90grams daily, increase fluid intake 70oz daily and 1200 calories using 2 meal replacements and 1 protien bar  Monitor/Evaluation: weekly weight, food journal, fluid intake and exercise level  Active Problems    1  Acute frontal sinusitis (461 1) (J01 10)   2  Acute URI (465 9) (J06 9)   3  Anxiety (300 00) (F41 9)   4  Cervicalgia (723 1) (M54 2)   5  Generalized anxiety disorder (300 02) (F41 1)   6  Headache (784 0) (R51)   7  Insomnia (780 52) (G47 00)   8  Lump in neck (784 2) (R22 1)   9  Lymphadenopathy (785 6) (R59 1)   10  Non-toxic multinodular goiter (241 1) (E04 2)   11  Panic disorder (300 01) (F41 0)   12  Weight gain (783 1) (R63 5)    Past Medical History    1  History of Acute sinusitis (461 9) (J01 90)   2  Lumbar Sprain (847 2)    Surgical History    1  Denied: History Of Prior Surgery    Family History    1  Family history of Osteoporosis (V17 81)    2  Family history of Family Health Status Of Father - Alive   3  Family history of Family Health Status Of Mother - Alive    Social History    · Denied: History of Alcohol Use (History)   · Caffeine Use   · Never A Smoker   · No drug use    Current Meds   1  ALPRAZolam 0 25 MG Oral Tablet; TAKE 1 TABLET TWICE DAILY AS NEEDED; Therapy: 93TNE0094 to (Evaluate:14Zbu2493); Last Rx:36Tcd3819 Ordered   2  Fluticasone Propionate 50 MCG/ACT Nasal Suspension; USE 1 SPRAY IN EACH   NOSTRIL TWICE DAILY; Therapy: 24YCT2446 to (Last Rx:13Jun2015)  Requested for: 13Jun2015 Ordered   3  Sertraline HCl - 100 MG Oral Tablet; Take 1 tablet daily; Therapy: 02LVP5874 to (Evaluate:59Dbr2540)  Requested for: 52Ary9596; Last   Rx:48Zel0164 Ordered   4  Tylenol 325 MG Oral Tablet; Therapy: (Recorded:66Ufi8121) to Recorded   5  Zolpidem Tartrate 5 MG Oral Tablet; 1 Every Day At Bedtime, As Needed;    Therapy: 47Xuc4199 to (Last Rx: 48OCR7914)  Requested for: 77Umw0207 Ordered    Allergies    1  No Known Drug Allergies    Vitals  Signs [Data Includes: Current Encounter]   Recorded: 39OAN9049 12:40PM   Height: 5 ft 6 5 in  Weight: 166 lb 12 8 oz  BMI Calculated: 26 52  BSA Calculated: 1 86    Results/Data  Encounter Results   Tanita Flowsheet 60VGK6774 12:17PM Ludmila Medina     Test Name Result Flag Reference   Height 66 5     Weight 166 8     Body Fat % 38 8     Fat Mass 64 8     FFM ( Fat Free Mass) 102     Muscle Mass 96 8     TBW ( Total Body Water) 69     TBW % 41 4     Bone Mass 5 2     BMR ( Basal Metabolic Rate) 8108     Metabolic Age 53     Visceral Fat Rating 6     BMI 26 5         Future Appointments    Date/Time Provider Specialty Site   03/03/2016 10:30 AM Ludmila Medina  [address]   03/03/2016 10:00 AM BROOKLYN Michael, MSW  [facility] [place]   03/17/2016 10:30 AM GUY Guerra   Bariatric Medicine [facility]     Signatures   Electronically signed by : Raymundo Ricketts, ; Feb 19 2016 12:16PM EST                       (Author)    Electronically signed by : GUY Caro ; Feb 19 2016  1:22PM EST                       (Co-author)

## 2018-01-13 NOTE — PROGRESS NOTES
History of Present Illness  HPI: Jordana Gibbons has lost 6 1# last few weeks - had the stomach bug  She has lost overall 14 3# in 2 months  Off schedule but starting back on track  Food Journaling - 1317-6767 calories  Trouble area late afternoon  Bariatric MNT MWM [facility]:   Weight Assessment: IBW: 132 5# ( 60 2kg), Goal Weight: 130-140#  Excess calories come from in between meal snacking, large portions at mealtimes and high calorie high fat food choices  Dietary Recall:   Breakfast: Shake  Snack: string cheese/ apple  Lunch: 400  Snack: bar  Dinner: [employer]  Snack: yogurt   Beverages: water  Estimated fluid intake: 6 cups water   She dines out 1-2 times per week  Exercise Frequency:  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 - Steps work day = 7193-4408 less on home days   Nutrition Prescription: Estimated calories for weight loss: , Estimated daily protein needs: -gm ( 12 -1 5 gm/kg IBW) and Estimated daily fluid needs: oz ( 35ml kg IBW)  Nutrition Intervention: Counseling provided with emphasis on meal planning, portion sizes, healthy snack choices, hydration, fiber intake, protein intake, exercise and food journaling  Education materials provided: [employer] manual    Comprehension: good  Expected Compliance: good  Goals: follow meal plan prescribed, reduce portion sizes at mealtimes, plan meals and snacks daily, Choose lower-calorie, lower-fat meal options at home and when dining out, food journal, do not skip meals or snacks and 1200 caloies flex up to 1300 on exercise days  Monitor/Evaluation: weekly weight, food journal, fluid intake and exercise level  Active Problems    1  Abnormal TSH (790 6) (R79 89)   2  Acute frontal sinusitis (461 1) (J01 10)   3  Acute URI (465 9) (J06 9)   4  Anxiety (300 00) (F41 9)   5  Back pain (724 5) (M54 9)   6  Cervicalgia (723 1) (M54 2)   7  Generalized anxiety disorder (300 02) (F41 1)   8  Headache (784 0) (R51)   9   Insomnia (780 52) (G47 00)   10  Lumbar paraspinal muscle spasm (724 8) (M62 830)   11  Lump in neck (784 2) (R22 1)   12  Lymphadenopathy (785 6) (R59 1)   13  Non-toxic multinodular goiter (241 1) (E04 2)   14  Panic disorder (300 01) (F41 0)   15  Skin rash (782 1) (R21)   16  Weight gain (783 1) (R63 5)    Past Medical History    1  History of Acute sinusitis (461 9) (J01 90)   2  Lumbar Sprain (847 2)    Surgical History    1  Denied: History Of Prior Surgery    Family History    1  Family history of Osteoporosis (V17 81)    2  Family history of Family Health Status Of Father - Alive   3  Family history of Family Health Status Of Mother - Alive    Social History    · Denied: History of Alcohol Use (History)   · Caffeine Use   · Never A Smoker   · No drug use    Current Meds   1  ALPRAZolam 0 25 MG Oral Tablet; TAKE 1 TABLET TWICE DAILY AS NEEDED; Therapy: 65UWP3752 to (Evaluate:72Guq1574); Last Rx:63Aix4872 Ordered   2  Methocarbamol 750 MG Oral Tablet; TAKE 1 TABLET 4 TIMES DAILY AS NEEDED; Therapy: 62MMH5883 to (Evaluate:04Apr2016); Last Rx:27Mar2016 Ordered   3  Ondansetron 4 MG Oral Tablet Dispersible; 1 tab po q 4-6 hrs prn nausea; Therapy: 99PUI5393 to (Last Rx:27Mar2016) Ordered   4  Sertraline HCl - 100 MG Oral Tablet; Take 1 tablet daily; Therapy: 40DLH2989 to (Evaluate:91Ioz1213)  Requested for: 55Xdj0506; Last   Rx:56Csj1416 Ordered   5  TraMADol HCl - 50 MG Oral Tablet; Take one tablet every six hours as needed for pain; Therapy: 64ZNL0963 to (Last Rx:27Mar2016) Ordered   6  Tylenol 325 MG Oral Tablet; Therapy: (Recorded:50Qtj4697) to Recorded   7  Zolpidem Tartrate 5 MG Oral Tablet; 1 Every Day At Bedtime, As Needed; Therapy: 12Fxf9157 to (Last Rx:88Ybx2569)  Requested for: 66Izn4832 Ordered    Allergies    1   No Known Drug Allergies    Future Appointments    Date/Time Provider Specialty Site   04/21/2016 10:30 AM Onofre Dao  [facility]   05/19/2016 10:00 AM Adry Bautista, GUY Adamson   Bariatric Medicine [facility]     Signatures   Electronically signed by : Pat Wang, ; Apr 7 2016 10:22AM EST                       (Author)    Electronically signed by : GUY Gallegos ; Apr 7 2016  1:28PM EST                       (Co-author)

## 2018-01-13 NOTE — MISCELLANEOUS
Provider Comments  Provider Comments:   Dear Violetta Davalos called the office to cancel your appointment for 7/21/16 but did not reschedule for another day  It is very important that you follow up with us so that we can assess your physical and nutritional safety  Please call our office at [phone] to reschedule your appointment       Sincerely,     [facility]        Signatures   Electronically signed by : Jian Mcknight, ; Jul 20 2016  9:01AM EST                       (Author)    Electronically signed by : GUY Crystal ; Jul 20 2016  9:12AM EST                       (Co-author)

## 2018-01-14 VITALS
HEIGHT: 67 IN | WEIGHT: 133.5 LBS | DIASTOLIC BLOOD PRESSURE: 70 MMHG | SYSTOLIC BLOOD PRESSURE: 100 MMHG | BODY MASS INDEX: 20.95 KG/M2

## 2018-01-14 VITALS
SYSTOLIC BLOOD PRESSURE: 100 MMHG | WEIGHT: 128.5 LBS | DIASTOLIC BLOOD PRESSURE: 70 MMHG | BODY MASS INDEX: 20.13 KG/M2 | RESPIRATION RATE: 16 BRPM | TEMPERATURE: 98.6 F | HEART RATE: 72 BPM

## 2018-01-16 NOTE — PROGRESS NOTES
History of Present Illness  HPI: Jennifer An with 3# loss in 2 weeks  Made many dietary changes including interval eating, meal planning, and reducing portion sizes at meal times  Food journaling using My FitnessPal averaging 926 and 1030  Bariatric MNT M[facility]:   Weight Assessment: IBW:     Excess calories come from in between meal snacking, large portions at mealtimes and high calorie high fat food choices  Dietary Recall:   Breakfast: Shake  Snack: 10:30   Carrots and hummas  yogurt  apple string cheese  Lunch: chicken and veggies  Mindful 300-400 calories     Home days - shake for lunch  Snack: Protein  Dinner: [employer]  Home: Dinner with family  Snack: Skip   Beverages: water  Estimated fluid intake: >64oz   She dines out 1-2 times per week  Exercise Frequency:  She does not exercise  Nutrition Prescription: Estimated calories for weight loss: Tanita BMR: 1432 x 1 3- 500-7500 = 9227-7003 calories daily Ecal BMR 1467 Weight loss: 1017, Estimated daily protein needs: 72-90 grams ( 1 2=1 5 gm/kg IBW) and Estimated daily fluid needs: 70 oz ( 35ml/kg IBW)  Nutrition Intervention: Counseling provided with emphasis on meal planning, portion sizes, healthy snack choices, hydration, fiber intake and food journaling  Education materials provided: [employer] manual    Comprehension: good  Expected Compliance: good  Goals: follow meal plan prescribed, reduce portion sizes at mealtimes, plan meals and snacks daily, Choose lower-calorie, lower-fat meal options at home and when dining out, food journal, do not skip meals or snacks and 2959-0724 calories daily using 2 meal replacements and 1 protein bar  Monitor/Evaluation: weekly weight, food journal, fluid intake and exercise level  Active Problems    1  Acute frontal sinusitis (461 1) (J01 10)   2  Acute URI (465 9) (J06 9)   3  Anxiety (300 00) (F41 9)   4  Cervicalgia (723 1) (M54 2)   5   Generalized anxiety disorder (300 02) (F41 1)   6  Headache (784 0) (R51)   7  Insomnia (780 52) (G47 00)   8  Lump in neck (784 2) (R22 1)   9  Lymphadenopathy (785 6) (R59 1)   10  Non-toxic multinodular goiter (241 1) (E04 2)   11  Panic disorder (300 01) (F41 0)   12  Weight gain (783 1) (R63 5)    Past Medical History    1  History of Acute sinusitis (461 9) (J01 90)   2  Lumbar Sprain (847 2)    Surgical History    1  Denied: History Of Prior Surgery    Family History    1  Family history of Osteoporosis (V17 81)    2  Family history of Family Health Status Of Father - Alive   3  Family history of Family Health Status Of Mother - Alive    Social History    · Denied: History of Alcohol Use (History)   · Caffeine Use   · Never A Smoker   · No drug use    Current Meds   1  ALPRAZolam 0 25 MG Oral Tablet; TAKE 1 TABLET TWICE DAILY AS NEEDED; Therapy: 30IYL9884 to (Evaluate:90Uaq2880); Last Rx:29Qdi3425 Ordered   2  Fluticasone Propionate 50 MCG/ACT Nasal Suspension; USE 1 SPRAY IN EACH   NOSTRIL TWICE DAILY; Therapy: 20RAS5285 to (Last Rx:13Jun2015)  Requested for: 13Jun2015 Ordered   3  Sertraline HCl - 100 MG Oral Tablet; Take 1 tablet daily; Therapy: 83KNV0557 to (Evaluate:36Vbd7793)  Requested for: 16Wxr7300; Last   Rx:75Gey8097 Ordered   4  Tylenol 325 MG Oral Tablet; Therapy: (Recorded:17Zqt6860) to Recorded   5  Zolpidem Tartrate 5 MG Oral Tablet; 1 Every Day At Bedtime, As Needed; Therapy: 48Gca7775 to (Last Rx:00Cny7883)  Requested for: 83Szu3317 Ordered    Allergies    1  No Known Drug Allergies    Vitals  Signs [Data Includes: Current Encounter]   Recorded: 97KPD1940 10:59AM   Height: 5 ft 6 5 in  Weight: 163 lb 9 6 oz  BMI Calculated: 26 01  BSA Calculated: 1 85    Future Appointments    Date/Time Provider Specialty Site   03/17/2016 11:00 AM Wilmer Sandhu  [facility]   03/17/2016 10:30 AM GUY Burleson   Bariatric [address] [place] Electronically signed by : Cyndy Hale, ; Mar  4 2016 11:03AM EST                       (Author)    Electronically signed by : GUY Cooper ; Mar  4 2016  1:46PM EST                       (Co-author)

## 2018-01-23 VITALS
WEIGHT: 132 LBS | BODY MASS INDEX: 20.72 KG/M2 | DIASTOLIC BLOOD PRESSURE: 80 MMHG | HEIGHT: 67 IN | SYSTOLIC BLOOD PRESSURE: 110 MMHG

## 2018-08-01 ENCOUNTER — APPOINTMENT (OUTPATIENT)
Dept: LAB | Facility: HOSPITAL | Age: 38
End: 2018-08-01

## 2018-08-01 ENCOUNTER — TRANSCRIBE ORDERS (OUTPATIENT)
Dept: ADMINISTRATIVE | Facility: HOSPITAL | Age: 38
End: 2018-08-01

## 2018-08-01 DIAGNOSIS — Z00.8 HEALTH EXAMINATION IN POPULATION SURVEY: ICD-10-CM

## 2018-08-01 DIAGNOSIS — Z00.8 HEALTH EXAMINATION IN POPULATION SURVEY: Primary | ICD-10-CM

## 2018-08-01 LAB
CHOLEST SERPL-MCNC: 182 MG/DL (ref 50–200)
EST. AVERAGE GLUCOSE BLD GHB EST-MCNC: 103 MG/DL
HBA1C MFR BLD: 5.2 % (ref 4.2–6.3)
HDLC SERPL-MCNC: 45 MG/DL (ref 40–60)
LDLC SERPL CALC-MCNC: 118 MG/DL (ref 0–100)
NONHDLC SERPL-MCNC: 137 MG/DL
TRIGL SERPL-MCNC: 94 MG/DL

## 2018-08-01 PROCEDURE — 80061 LIPID PANEL: CPT

## 2018-08-01 PROCEDURE — 83036 HEMOGLOBIN GLYCOSYLATED A1C: CPT

## 2018-08-01 PROCEDURE — 36415 COLL VENOUS BLD VENIPUNCTURE: CPT

## 2018-09-10 DIAGNOSIS — F41.1 GAD (GENERALIZED ANXIETY DISORDER): Primary | ICD-10-CM

## 2018-09-10 NOTE — TELEPHONE ENCOUNTER
Patient called our office requesting a refill for Xanax  Patient only has two pills left  Leaving for [place] Monday of next week  Call back # is [phone]    Routed to Dr Nikole Bain to review and advise  Would you like me to schedule an appt for patient?

## 2018-09-11 RX ORDER — ALPRAZOLAM 0.25 MG/1
0.25 TABLET ORAL
Qty: 30 TABLET | Refills: 0 | Status: SHIPPED | OUTPATIENT
Start: 2018-09-11 | End: 2018-11-28 | Stop reason: SDUPTHER

## 2018-09-11 NOTE — TELEPHONE ENCOUNTER
Needs to be sent to [facility] so that it can then be taken up to [facility] for her    Called again

## 2018-11-28 ENCOUNTER — OFFICE VISIT (OUTPATIENT)
Dept: FAMILY MEDICINE CLINIC | Facility: MEDICAL CENTER | Age: 38
End: 2018-11-28

## 2018-11-28 VITALS
HEART RATE: 109 BPM | OXYGEN SATURATION: 98 % | HEIGHT: 67 IN | WEIGHT: 139 LBS | SYSTOLIC BLOOD PRESSURE: 110 MMHG | BODY MASS INDEX: 21.82 KG/M2 | DIASTOLIC BLOOD PRESSURE: 70 MMHG

## 2018-11-28 DIAGNOSIS — G47.00 INSOMNIA, UNSPECIFIED TYPE: Primary | ICD-10-CM

## 2018-11-28 DIAGNOSIS — F41.1 GAD (GENERALIZED ANXIETY DISORDER): ICD-10-CM

## 2018-11-28 DIAGNOSIS — R79.89 HIGH SERUM THYROID STIMULATING HORMONE (TSH): ICD-10-CM

## 2018-11-28 PROCEDURE — 99213 OFFICE O/P EST LOW 20 MIN: CPT | Performed by: FAMILY MEDICINE

## 2018-11-28 PROCEDURE — 3008F BODY MASS INDEX DOCD: CPT | Performed by: FAMILY MEDICINE

## 2018-11-28 RX ORDER — ALPRAZOLAM 0.25 MG/1
0.25 TABLET ORAL
Qty: 30 TABLET | Refills: 1 | Status: SHIPPED | OUTPATIENT
Start: 2018-11-28 | End: 2019-05-01 | Stop reason: SDUPTHER

## 2018-11-28 RX ORDER — LORATADINE 10 MG/1
10 TABLET ORAL DAILY
COMMUNITY

## 2018-11-28 RX ORDER — ZOLPIDEM TARTRATE 12.5 MG/1
12.5 TABLET, FILM COATED, EXTENDED RELEASE ORAL
Qty: 30 TABLET | Refills: 1 | Status: SHIPPED | OUTPATIENT
Start: 2018-11-28 | End: 2019-07-17 | Stop reason: SDUPTHER

## 2018-11-29 NOTE — PROGRESS NOTES
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 [employer]  In regards to her anxiety she has one or 2 times a week where she finds that Xanax is helpful  The frequency of her Xanax use has not increased  There has been no indication of abuse  She had been taking Zoloft in the past, she does not wish to continue taking that  In terms of insomnia, this is also an infrequent problem  She does use Ambien occasionally maybe once a week  It works well for her and she has no untoward affects    /70 (BP Location: Left arm, Patient Position: Sitting, Cuff Size: Adult)   Pulse (!) 109   Ht 5' 6 75" (1 695 m)   Wt 63 kg (139 lb)   SpO2 98%   BMI 21 93 kg/m²     HEENT examination is within normal limits no acute findings  Neck was supple  Chest clear  Cardiac exam revealed a regular rate and rhythm without murmur rub or gallop  Abdomen is soft and nontender  Her neck did seem slightly full and I considered that there might be some thyromegaly here  In terms of her anxiety and insomnia, it is stable with stable use of p r n  Medications  Will continue the Xanax and Ambien    Review of her records does show that she had a slightly elevated TSH with a T4 that was technically in normal range, but at the bottom of that range  Will check TSH with reflex to T4    We may also consider ordering a thyroid ultrasound, I want to see what the results of her blood work is 1st

## 2018-12-14 ENCOUNTER — APPOINTMENT (OUTPATIENT)
Dept: LAB | Facility: HOSPITAL | Age: 38
End: 2018-12-14
Payer: COMMERCIAL

## 2018-12-14 DIAGNOSIS — R79.89 HIGH SERUM THYROID STIMULATING HORMONE (TSH): ICD-10-CM

## 2018-12-14 LAB — TSH SERPL DL<=0.05 MIU/L-ACNC: 2.73 UIU/ML (ref 0.36–3.74)

## 2018-12-14 PROCEDURE — 84443 ASSAY THYROID STIM HORMONE: CPT

## 2018-12-14 PROCEDURE — 36415 COLL VENOUS BLD VENIPUNCTURE: CPT

## 2018-12-17 ENCOUNTER — ANNUAL EXAM (OUTPATIENT)
Dept: OBGYN CLINIC | Facility: CLINIC | Age: 38
End: 2018-12-17
Payer: COMMERCIAL

## 2018-12-17 VITALS
SYSTOLIC BLOOD PRESSURE: 110 MMHG | WEIGHT: 138 LBS | HEIGHT: 67 IN | BODY MASS INDEX: 21.66 KG/M2 | DIASTOLIC BLOOD PRESSURE: 70 MMHG

## 2018-12-17 DIAGNOSIS — Z01.419 ENCOUNTER FOR ANNUAL ROUTINE GYNECOLOGICAL EXAMINATION: Primary | ICD-10-CM

## 2018-12-17 PROCEDURE — 99395 PREV VISIT EST AGE 18-39: CPT | Performed by: OBSTETRICS & GYNECOLOGY

## 2018-12-17 PROCEDURE — 87624 HPV HI-RISK TYP POOLED RSLT: CPT | Performed by: OBSTETRICS & GYNECOLOGY

## 2018-12-17 PROCEDURE — G0145 SCR C/V CYTO,THINLAYER,RESCR: HCPCS | Performed by: OBSTETRICS & GYNECOLOGY

## 2018-12-17 NOTE — PROGRESS NOTES
Assessment/Plan:    Pap smear done as well as annual   Encouraged self-breast examination as well as calcium supplementation  Reviewed menstrual diary, Mirena placed 2016  She is aware this is affective until   She will follow up with her primary care physician regarding left upper quadrant discomfort  Return to office in 1 year or p r n  No problem-specific Assessment & Plan notes found for this encounter  Diagnoses and all orders for this visit:    Encounter for annual routine gynecological examination          Subjective:      Patient ID: Steff Castañeda is a 41 y o  female  HPI     This is a 41-year-old female  ( x2, age 9, 10) presents for annual gyn exam   She had a Mirena IUD placed 2016, prior to that another IUD for 5 years  She has been amenorrheic for 7 years since the IUD was inserted  She denies any changes in bowel or bladder function  She is sexually active and has been in a monogamous relationship for over 12 years  All her Pap smears have been normal   Over the last couple of months, she has complained of sensory changes in the left upper quadrant radiating to the epigastrium  She denies any rashes, fever or chills  She denies any nausea or vomiting or changes in appetite  Her weight has remained stable over the last year, after a 40 lb weight loss  The following portions of the patient's history were reviewed and updated as appropriate: allergies, current medications, past family history, past medical history, past social history, past surgical history and problem list     Review of Systems   Constitutional: Negative for fatigue, fever and unexpected weight change  Respiratory: Negative for cough, chest tightness, shortness of breath and wheezing  Cardiovascular: Negative  Negative for chest pain and palpitations  Gastrointestinal: Negative    Negative for abdominal distention, abdominal pain, blood in stool, constipation, diarrhea, nausea and vomiting  Genitourinary: Negative  Negative for difficulty urinating, dyspareunia, dysuria, flank pain, frequency, genital sores, hematuria, pelvic pain, urgency, vaginal bleeding, vaginal discharge and vaginal pain  Skin: Negative for rash  Objective:      /70   Ht 5' 6 75" (1 695 m)   Wt 62 6 kg (138 lb)   Breastfeeding? No   BMI 21 78 kg/m²          Physical Exam   Constitutional: She appears well-developed and well-nourished  Cardiovascular: Normal rate and regular rhythm  Pulmonary/Chest: Effort normal and breath sounds normal  Right breast exhibits no inverted nipple, no mass, no nipple discharge, no skin change and no tenderness  Left breast exhibits no inverted nipple, no mass, no nipple discharge, no skin change and no tenderness  Abdominal: Soft  Bowel sounds are normal  She exhibits no distension  There is no tenderness  There is no rebound and no guarding  Genitourinary: Vagina normal and uterus normal  There is no lesion on the right labia  There is no lesion on the left labia  Cervix exhibits no discharge and no friability  Right adnexum displays no mass, no tenderness and no fullness  Left adnexum displays no mass, no tenderness and no fullness  No vaginal discharge found  Genitourinary Comments: The vagina is well estrogenized  Cervix is parous  The IUD string is visualized  There is no evidence of pelvic floor prolapse  Skin: No rash noted  No erythema

## 2018-12-18 ENCOUNTER — OFFICE VISIT (OUTPATIENT)
Dept: FAMILY MEDICINE CLINIC | Facility: MEDICAL CENTER | Age: 38
End: 2018-12-18
Payer: COMMERCIAL

## 2018-12-18 VITALS
OXYGEN SATURATION: 98 % | DIASTOLIC BLOOD PRESSURE: 72 MMHG | SYSTOLIC BLOOD PRESSURE: 116 MMHG | HEART RATE: 94 BPM | BODY MASS INDEX: 21.78 KG/M2 | WEIGHT: 138 LBS

## 2018-12-18 DIAGNOSIS — M79.89 SOFT TISSUE MASS: Primary | ICD-10-CM

## 2018-12-18 PROCEDURE — 99213 OFFICE O/P EST LOW 20 MIN: CPT | Performed by: FAMILY MEDICINE

## 2018-12-18 NOTE — PROGRESS NOTES
Patient describes a soft fleshy mass over the left chest wall below left breast   It is not discrete  She also has some numbness over lying that  It does not hurt  It has been there for months  Review of systems is completely normal   No chest pain or shortness of breath  /72 (BP Location: Left arm, Patient Position: Sitting, Cuff Size: Adult)   Pulse 94   Wt 62 6 kg (138 lb)   SpO2 98%   BMI 21 78 kg/m²     There is a very ill-defined but apparent soft tissue fullness on the over the left chest wall which is somewhat asymmetric when compared to the right  It is not discrete on palpation    She does have loss of light touch over it  I believe this this may be an asymmetry of for subcutaneous fat  Will check an ultrasound of the soft tissues there and rule out any pathologic process

## 2018-12-20 ENCOUNTER — HOSPITAL ENCOUNTER (OUTPATIENT)
Dept: ULTRASOUND IMAGING | Facility: HOSPITAL | Age: 38
Discharge: HOME | End: 2018-12-20
Payer: COMMERCIAL

## 2018-12-20 DIAGNOSIS — M79.89 SOFT TISSUE MASS: ICD-10-CM

## 2018-12-20 PROCEDURE — 76705 ECHO EXAM OF ABDOMEN: CPT

## 2018-12-21 LAB
HPV HR 12 DNA CVX QL NAA+PROBE: NEGATIVE
HPV16 DNA CVX QL NAA+PROBE: NEGATIVE
HPV18 DNA CVX QL NAA+PROBE: NEGATIVE
LAB AP GYN PRIMARY INTERPRETATION: NORMAL
Lab: NORMAL

## 2019-05-01 DIAGNOSIS — F41.1 GAD (GENERALIZED ANXIETY DISORDER): ICD-10-CM

## 2019-05-01 RX ORDER — ALPRAZOLAM 0.25 MG/1
0.25 TABLET ORAL
Qty: 90 TABLET | Refills: 0 | Status: SHIPPED | OUTPATIENT
Start: 2019-05-01 | End: 2019-07-17 | Stop reason: SDUPTHER

## 2019-07-11 DIAGNOSIS — R79.89 HIGH SERUM THYROID STIMULATING HORMONE (TSH): Primary | ICD-10-CM

## 2019-07-12 ENCOUNTER — APPOINTMENT (OUTPATIENT)
Dept: LAB | Facility: HOSPITAL | Age: 39
End: 2019-07-12
Payer: COMMERCIAL

## 2019-07-12 DIAGNOSIS — R79.89 HIGH SERUM THYROID STIMULATING HORMONE (TSH): ICD-10-CM

## 2019-07-12 LAB
T4 FREE SERPL-MCNC: 0.89 NG/DL (ref 0.76–1.46)
TSH SERPL DL<=0.05 MIU/L-ACNC: 4.57 UIU/ML (ref 0.36–3.74)

## 2019-07-12 PROCEDURE — 36415 COLL VENOUS BLD VENIPUNCTURE: CPT

## 2019-07-12 PROCEDURE — 84443 ASSAY THYROID STIM HORMONE: CPT

## 2019-07-12 PROCEDURE — 84439 ASSAY OF FREE THYROXINE: CPT

## 2019-07-17 ENCOUNTER — OFFICE VISIT (OUTPATIENT)
Dept: FAMILY MEDICINE CLINIC | Facility: MEDICAL CENTER | Age: 39
End: 2019-07-17
Payer: COMMERCIAL

## 2019-07-17 VITALS
SYSTOLIC BLOOD PRESSURE: 110 MMHG | HEART RATE: 85 BPM | DIASTOLIC BLOOD PRESSURE: 70 MMHG | OXYGEN SATURATION: 98 % | WEIGHT: 142 LBS | BODY MASS INDEX: 22.41 KG/M2

## 2019-07-17 DIAGNOSIS — E01.0 THYROMEGALY: Primary | ICD-10-CM

## 2019-07-17 DIAGNOSIS — F41.1 GAD (GENERALIZED ANXIETY DISORDER): ICD-10-CM

## 2019-07-17 DIAGNOSIS — G47.00 INSOMNIA, UNSPECIFIED TYPE: ICD-10-CM

## 2019-07-17 DIAGNOSIS — R79.89 HIGH SERUM THYROID STIMULATING HORMONE (TSH): ICD-10-CM

## 2019-07-17 PROCEDURE — 99214 OFFICE O/P EST MOD 30 MIN: CPT | Performed by: FAMILY MEDICINE

## 2019-07-17 RX ORDER — ZOLPIDEM TARTRATE 12.5 MG/1
12.5 TABLET, FILM COATED, EXTENDED RELEASE ORAL
Qty: 30 TABLET | Refills: 1 | Status: SHIPPED | OUTPATIENT
Start: 2019-07-17 | End: 2020-01-22 | Stop reason: SDUPTHER

## 2019-07-17 RX ORDER — ALPRAZOLAM 0.25 MG/1
0.25 TABLET ORAL
Qty: 90 TABLET | Refills: 0 | Status: SHIPPED | OUTPATIENT
Start: 2019-07-17 | End: 2020-01-22 | Stop reason: SDUPTHER

## 2019-07-18 ENCOUNTER — HOSPITAL ENCOUNTER (OUTPATIENT)
Dept: ULTRASOUND IMAGING | Facility: HOSPITAL | Age: 39
Discharge: HOME | End: 2019-07-18
Payer: COMMERCIAL

## 2019-07-18 DIAGNOSIS — R79.89 HIGH SERUM THYROID STIMULATING HORMONE (TSH): ICD-10-CM

## 2019-07-18 DIAGNOSIS — E01.0 THYROMEGALY: ICD-10-CM

## 2019-07-18 PROCEDURE — 76536 US EXAM OF HEAD AND NECK: CPT

## 2019-07-18 NOTE — PROGRESS NOTES
Patient is here for follow-up of generalized anxiety and insomnia  She uses alprazolam p r n  Min Glass She uses it several times a week  I have not seen any pattern of abuse  About once or twice a week she uses Ambien for insomnia  It works well and she has had no untoward effects with it  She also worries about her thyroid  The anesthesiologist in the [facility] [address] [facility] [address] where she works said that she had a goiter    Review of systems for GI  cardiac pulmonary and neurologic systems are all negative  ENT review of systems is also negative  Past medical history, past surgical history, family medical history, social history, and medication list were all reviewed  /70 (BP Location: Left arm, Patient Position: Sitting, Cuff Size: Adult)   Pulse 85   Wt 64 4 kg (142 lb)   SpO2 98%   BMI 22 41 kg/m²     HEENT examination is within normal limits no acute findings  Neck was supple with goiter  Chest clear  Cardiac exam revealed a regular rate and rhythm without murmur rub or gallop  Abdomen is soft and nontender  Will continue medications  Will check a thyroid ultrasound and thyroid antibodies

## 2019-07-26 ENCOUNTER — APPOINTMENT (OUTPATIENT)
Dept: LAB | Facility: HOSPITAL | Age: 39
End: 2019-07-26
Payer: COMMERCIAL

## 2019-07-26 DIAGNOSIS — R79.89 HIGH SERUM THYROID STIMULATING HORMONE (TSH): ICD-10-CM

## 2019-07-26 DIAGNOSIS — E01.0 THYROMEGALY: ICD-10-CM

## 2019-07-26 PROCEDURE — 36415 COLL VENOUS BLD VENIPUNCTURE: CPT

## 2019-07-26 PROCEDURE — 86800 THYROGLOBULIN ANTIBODY: CPT

## 2019-07-26 PROCEDURE — 86376 MICROSOMAL ANTIBODY EACH: CPT

## 2019-07-27 LAB
THYROGLOB AB SERPL-ACNC: <1 IU/ML (ref 0–0.9)
THYROPEROXIDASE AB SERPL-ACNC: 34 IU/ML (ref 0–34)

## 2019-07-29 DIAGNOSIS — R79.89 HIGH SERUM THYROID STIMULATING HORMONE (TSH): Primary | ICD-10-CM

## 2019-07-29 DIAGNOSIS — E04.9 GOITER: ICD-10-CM

## 2019-07-29 RX ORDER — LEVOTHYROXINE SODIUM 0.07 MG/1
75 TABLET ORAL
Qty: 30 TABLET | Refills: 5 | Status: SHIPPED | OUTPATIENT
Start: 2019-07-29 | End: 2020-02-04 | Stop reason: SDUPTHER

## 2019-10-16 DIAGNOSIS — R79.89 HIGH SERUM THYROID STIMULATING HORMONE (TSH): Primary | ICD-10-CM

## 2019-10-17 ENCOUNTER — APPOINTMENT (OUTPATIENT)
Dept: LAB | Facility: HOSPITAL | Age: 39
End: 2019-10-17
Payer: COMMERCIAL

## 2019-10-17 DIAGNOSIS — R79.89 HIGH SERUM THYROID STIMULATING HORMONE (TSH): ICD-10-CM

## 2019-10-17 LAB — TSH SERPL DL<=0.05 MIU/L-ACNC: 0.96 UIU/ML (ref 0.36–3.74)

## 2019-10-17 PROCEDURE — 36415 COLL VENOUS BLD VENIPUNCTURE: CPT

## 2019-10-17 PROCEDURE — 84443 ASSAY THYROID STIM HORMONE: CPT

## 2019-12-10 ENCOUNTER — ANNUAL EXAM (OUTPATIENT)
Dept: OBGYN CLINIC | Facility: CLINIC | Age: 39
End: 2019-12-10
Payer: COMMERCIAL

## 2019-12-10 VITALS
BODY MASS INDEX: 21.35 KG/M2 | HEIGHT: 67 IN | SYSTOLIC BLOOD PRESSURE: 110 MMHG | DIASTOLIC BLOOD PRESSURE: 68 MMHG | WEIGHT: 136 LBS

## 2019-12-10 DIAGNOSIS — Z01.419 WOMEN'S ANNUAL ROUTINE GYNECOLOGICAL EXAMINATION: Primary | ICD-10-CM

## 2019-12-10 PROCEDURE — 99395 PREV VISIT EST AGE 18-39: CPT | Performed by: NURSE PRACTITIONER

## 2019-12-10 NOTE — PROGRESS NOTES
Subjective    HPI:     Adriana Motta is a 46 y o  female, happily   She is a  2 Para 2, with 2 prior vaginal deliveries  Her menstrual cycles are sporadic, spotting occasionally  Her current method of contraception includes Mirena IUD  Mirena will be due for replacement in 2021  She denies issues with intimacy  She denies /GI and Gyn complaints  She complains of anxiety for which she takes xanax PRN  Medical, surgical and family history reviewed  She was dx this summer with hypothyroidism  Her dental care is up-to-date  She eats a healthy diet and exercises regularly  She is happy with her weight  Gynecologic History    No LMP recorded  (Menstrual status: Birth Control)  Last Pap: 2018  Results were: normal    Obstetric History    OB History    Para Term  AB Living   2         2   SAB TAB Ectopic Multiple Live Births                  # Outcome Date GA Lbr Denzel/2nd Weight Sex Delivery Anes PTL Lv   2       Vag-Spont      1       Vag-Spont          The following portions of the patient's history were reviewed and updated as appropriate: allergies, current medications, past family history, past medical history, past social history, past surgical history and problem list     Review of Systems    Pertinent items are noted in HPI  Objective    Physical Exam   Constitutional: Vital signs are normal  She appears well-developed and well-nourished  Genitourinary: Vagina normal and uterus normal  Pelvic exam was performed with patient supine  There is no rash, tenderness, lesion or Bartholin's cyst on the right labia  There is no rash, tenderness, lesion or Bartholin's cyst on the left labia  Right adnexum does not display mass, does not display tenderness and does not display fullness  Left adnexum does not display mass, does not display tenderness and does not display fullness  Cervix is parous  Cervix exhibits visible IUD strings   Cervix does not exhibit motion tenderness, lesion, discharge, friability, polyp or nabothian cyst      Uterus is anteverted  HENT:   Head: Normocephalic and atraumatic  Neck: Neck supple  No thyromegaly present  Cardiovascular: Normal rate, regular rhythm, S1 normal, S2 normal and normal heart sounds  Pulmonary/Chest: Effort normal and breath sounds normal  Right breast exhibits no inverted nipple, no mass, no nipple discharge, no skin change and no tenderness  Left breast exhibits no inverted nipple, no mass, no nipple discharge, no skin change and no tenderness  Abdominal: Soft  Bowel sounds are normal  She exhibits no distension and no mass  There is no tenderness  There is no guarding  Lymphadenopathy:     She has no cervical adenopathy  She has no axillary adenopathy  Neurological: She is alert  Skin: Skin is warm  Psychiatric: She has a normal mood and affect  Nursing note and vitals reviewed  Assessment and Plan    Dino Godoy was seen today for gynecologic exam     Diagnoses and all orders for this visit:    Women's annual routine gynecological examination      Patient informed of a Stable GYN exam  A pap smear was not performed due to a negative HPV pap in 2018  I have discussed the importance of exercise and healthy diet as well as adequate intake of calcium and vitamin D  The current ASCCP guidelines were reviewed  The low risk patient will receive pap smear screening every 3 years until the age of 34 and then every 3 to 5 years with HPV co-testing from the ages of 33-67  I emphasized the importance of an annual pelvic and breast exam  A yearly mammogram is recommended for breast cancer screening starting at age 36  All questions have been answered to her satisfaction  Contraception: IUD  Follow up in: 1 year

## 2020-01-22 ENCOUNTER — OFFICE VISIT (OUTPATIENT)
Dept: FAMILY MEDICINE CLINIC | Facility: MEDICAL CENTER | Age: 40
End: 2020-01-22
Payer: COMMERCIAL

## 2020-01-22 VITALS
OXYGEN SATURATION: 98 % | SYSTOLIC BLOOD PRESSURE: 110 MMHG | BODY MASS INDEX: 21.35 KG/M2 | DIASTOLIC BLOOD PRESSURE: 68 MMHG | HEIGHT: 67 IN | WEIGHT: 136 LBS | HEART RATE: 82 BPM

## 2020-01-22 DIAGNOSIS — F41.1 GAD (GENERALIZED ANXIETY DISORDER): ICD-10-CM

## 2020-01-22 DIAGNOSIS — F41.9 ANXIETY: ICD-10-CM

## 2020-01-22 DIAGNOSIS — E03.9 ACQUIRED HYPOTHYROIDISM: Primary | ICD-10-CM

## 2020-01-22 DIAGNOSIS — G47.00 INSOMNIA, UNSPECIFIED TYPE: ICD-10-CM

## 2020-01-22 PROCEDURE — 99214 OFFICE O/P EST MOD 30 MIN: CPT | Performed by: FAMILY MEDICINE

## 2020-01-22 RX ORDER — ALPRAZOLAM 0.25 MG/1
0.25 TABLET ORAL
Qty: 90 TABLET | Refills: 0 | Status: SHIPPED | OUTPATIENT
Start: 2020-01-22 | End: 2020-10-01 | Stop reason: SDUPTHER

## 2020-01-22 RX ORDER — ZOLPIDEM TARTRATE 12.5 MG/1
12.5 TABLET, FILM COATED, EXTENDED RELEASE ORAL
Qty: 30 TABLET | Refills: 1 | Status: SHIPPED | OUTPATIENT
Start: 2020-01-22 | End: 2020-11-23 | Stop reason: SDUPTHER

## 2020-01-23 NOTE — PROGRESS NOTES
Hypothyroidism    She is taking levothyroxine 75 mcg  Her TSH has been at goal   She is tolerating the medication well  Will continue levothyroxine    Insomnia    Patient takes zolpidem at as needed  It works out to about once a week  She tolerates that well and it works well  Will continue occasional use of zolpidem  Anxiety    She uses Xanax rarely  It works well for her  Will continue the Xanax and watch for any increase usage or inappropriate refills  Past medical history, past surgical history, family medical history, social history, and medication list were all reviewed  Review of systems for GI  cardiac pulmonary and neurologic systems are all negative  ENT review of systems is also negative  /68 (BP Location: Left arm, Patient Position: Sitting, Cuff Size: Adult)   Pulse 82   Ht 5' 7" (1 702 m)   Wt 61 7 kg (136 lb)   SpO2 98%   BMI 21 30 kg/m²     HEENT examination is within normal limits no acute findings  Neck was supple  Chest clear  Cardiac exam revealed a regular rate and rhythm without murmur rub or gallop  Abdomen is soft and nontender      See above for assessment and plan for the patient's individual problems on the diagnosis list

## 2020-02-04 DIAGNOSIS — E04.9 GOITER: ICD-10-CM

## 2020-02-05 RX ORDER — LEVOTHYROXINE SODIUM 0.07 MG/1
75 TABLET ORAL
Qty: 30 TABLET | Refills: 5 | Status: SHIPPED | OUTPATIENT
Start: 2020-02-05 | End: 2020-08-24

## 2020-04-13 ENCOUNTER — TELEMEDICINE (OUTPATIENT)
Dept: FAMILY MEDICINE CLINIC | Facility: MEDICAL CENTER | Age: 40
End: 2020-04-13
Payer: COMMERCIAL

## 2020-04-13 VITALS — BODY MASS INDEX: 21.3 KG/M2 | HEIGHT: 67 IN

## 2020-04-13 DIAGNOSIS — Z20.828 EXPOSURE TO SARS-ASSOCIATED CORONAVIRUS: Primary | ICD-10-CM

## 2020-04-13 PROCEDURE — 99213 OFFICE O/P EST LOW 20 MIN: CPT | Performed by: FAMILY MEDICINE

## 2020-04-14 DIAGNOSIS — Z20.828 EXPOSURE TO SARS-ASSOCIATED CORONAVIRUS: ICD-10-CM

## 2020-04-14 PROCEDURE — 87635 SARS-COV-2 COVID-19 AMP PRB: CPT

## 2020-04-15 ENCOUNTER — TELEPHONE (OUTPATIENT)
Dept: FAMILY MEDICINE CLINIC | Facility: MEDICAL CENTER | Age: 40
End: 2020-04-15

## 2020-04-15 DIAGNOSIS — G44.209 MUSCLE TENSION HEADACHE: Primary | ICD-10-CM

## 2020-04-15 LAB
INPATIENT: NORMAL
SARS-COV-2 RNA SPEC QL NAA+PROBE: NOT DETECTED

## 2020-04-15 RX ORDER — BUTALBITAL, ACETAMINOPHEN AND CAFFEINE 50; 325; 40 MG/1; MG/1; MG/1
1-2 TABLET ORAL EVERY 6 HOURS PRN
Qty: 30 TABLET | Refills: 0 | Status: SHIPPED | OUTPATIENT
Start: 2020-04-15 | End: 2020-11-23 | Stop reason: SDUPTHER

## 2020-04-15 RX ORDER — BUTALBITAL, ACETAMINOPHEN AND CAFFEINE 50; 325; 40 MG/1; MG/1; MG/1
1-2 TABLET ORAL EVERY 6 HOURS PRN
Qty: 30 TABLET | Refills: 0 | Status: SHIPPED | OUTPATIENT
Start: 2020-04-15 | End: 2020-04-15 | Stop reason: SDUPTHER

## 2020-08-23 DIAGNOSIS — E04.9 GOITER: ICD-10-CM

## 2020-08-24 RX ORDER — LEVOTHYROXINE SODIUM 0.07 MG/1
TABLET ORAL
Qty: 30 TABLET | Refills: 0 | Status: SHIPPED | OUTPATIENT
Start: 2020-08-24 | End: 2020-10-01 | Stop reason: SDUPTHER

## 2020-10-01 DIAGNOSIS — F41.1 GAD (GENERALIZED ANXIETY DISORDER): ICD-10-CM

## 2020-10-01 DIAGNOSIS — E04.9 GOITER: ICD-10-CM

## 2020-10-01 RX ORDER — ALPRAZOLAM 0.25 MG/1
0.25 TABLET ORAL
Qty: 90 TABLET | Refills: 0 | Status: SHIPPED | OUTPATIENT
Start: 2020-10-01 | End: 2020-11-23 | Stop reason: SDUPTHER

## 2020-10-01 RX ORDER — LEVOTHYROXINE SODIUM 0.07 MG/1
75 TABLET ORAL
Qty: 90 TABLET | Refills: 0 | Status: SHIPPED | OUTPATIENT
Start: 2020-10-01 | End: 2020-11-23 | Stop reason: SDUPTHER

## 2020-11-18 ENCOUNTER — TELEPHONE (OUTPATIENT)
Dept: FAMILY MEDICINE CLINIC | Facility: MEDICAL CENTER | Age: 40
End: 2020-11-18

## 2020-11-23 DIAGNOSIS — E04.9 GOITER: ICD-10-CM

## 2020-11-23 DIAGNOSIS — G44.209 MUSCLE TENSION HEADACHE: ICD-10-CM

## 2020-11-23 DIAGNOSIS — G47.00 INSOMNIA, UNSPECIFIED TYPE: ICD-10-CM

## 2020-11-23 DIAGNOSIS — F41.1 GAD (GENERALIZED ANXIETY DISORDER): ICD-10-CM

## 2020-11-25 RX ORDER — ZOLPIDEM TARTRATE 12.5 MG/1
12.5 TABLET, FILM COATED, EXTENDED RELEASE ORAL
Qty: 30 TABLET | Refills: 0 | Status: SHIPPED | OUTPATIENT
Start: 2020-11-25

## 2020-11-25 RX ORDER — BUTALBITAL, ACETAMINOPHEN AND CAFFEINE 50; 325; 40 MG/1; MG/1; MG/1
1 TABLET ORAL EVERY 6 HOURS PRN
Qty: 30 TABLET | Refills: 0 | Status: SHIPPED | OUTPATIENT
Start: 2020-11-25

## 2020-11-25 RX ORDER — LEVOTHYROXINE SODIUM 0.07 MG/1
75 TABLET ORAL
Qty: 90 TABLET | Refills: 0 | Status: SHIPPED | OUTPATIENT
Start: 2020-11-25

## 2020-11-25 RX ORDER — ALPRAZOLAM 0.25 MG/1
0.25 TABLET ORAL
Qty: 30 TABLET | Refills: 0 | Status: SHIPPED | OUTPATIENT
Start: 2020-11-25

## 2020-12-02 ENCOUNTER — ANNUAL EXAM (OUTPATIENT)
Dept: OBGYN CLINIC | Facility: CLINIC | Age: 40
End: 2020-12-02
Payer: COMMERCIAL

## 2020-12-02 VITALS
HEIGHT: 67 IN | WEIGHT: 148 LBS | SYSTOLIC BLOOD PRESSURE: 112 MMHG | DIASTOLIC BLOOD PRESSURE: 74 MMHG | BODY MASS INDEX: 23.23 KG/M2

## 2020-12-02 DIAGNOSIS — Z01.419 WOMEN'S ANNUAL ROUTINE GYNECOLOGICAL EXAMINATION: Primary | ICD-10-CM

## 2020-12-02 DIAGNOSIS — Z11.4 SCREENING FOR HIV (HUMAN IMMUNODEFICIENCY VIRUS): ICD-10-CM

## 2020-12-02 DIAGNOSIS — Z12.31 ENCOUNTER FOR SCREENING MAMMOGRAM FOR BREAST CANCER: ICD-10-CM

## 2020-12-02 PROCEDURE — 99395 PREV VISIT EST AGE 18-39: CPT | Performed by: NURSE PRACTITIONER

## 2021-11-23 ENCOUNTER — TELEPHONE (OUTPATIENT)
Dept: OBGYN CLINIC | Facility: CLINIC | Age: 41
End: 2021-11-23